# Patient Record
Sex: MALE | Race: WHITE | Employment: OTHER | URBAN - METROPOLITAN AREA
[De-identification: names, ages, dates, MRNs, and addresses within clinical notes are randomized per-mention and may not be internally consistent; named-entity substitution may affect disease eponyms.]

---

## 2020-09-15 DIAGNOSIS — M54.50 LOW BACK PAIN, UNSPECIFIED BACK PAIN LATERALITY, UNSPECIFIED CHRONICITY, UNSPECIFIED WHETHER SCIATICA PRESENT: Primary | ICD-10-CM

## 2020-09-15 DIAGNOSIS — Z11.59 ENCOUNTER FOR SCREENING FOR OTHER VIRAL DISEASES: Primary | ICD-10-CM

## 2020-09-23 RX ORDER — GABAPENTIN 300 MG/1
900 CAPSULE ORAL 3 TIMES DAILY
COMMUNITY

## 2020-09-23 RX ORDER — METHYLPREDNISOLONE 4 MG
TABLET, DOSE PACK ORAL SEE ADMIN INSTRUCTIONS
COMMUNITY
Start: 2020-09-21 | End: 2020-10-06

## 2020-09-23 RX ORDER — ACETAMINOPHEN 500 MG
500-1000 TABLET ORAL EVERY 6 HOURS PRN
COMMUNITY

## 2020-09-28 NOTE — PHARMACY-ADMISSION MEDICATION HISTORY
Admission medication history interview status for this patient is complete. See [facility] admission navigator for allergy information, prior to admission medications and immunization status.     PTA meds completed by pre-admitting nurse, Julisa Aguilera RN, and reviewed by pharmacy.     Prior to Admission medications    Medication Sig Last Dose Taking? Auth Provider   acetaminophen (TYLENOL) 500 MG tablet Take 500-1,000 mg by mouth every 6 hours as needed for mild pain  Yes Reported, Patient   gabapentin (NEURONTIN) 300 MG capsule Take 900 mg by mouth 3 times daily  Yes Reported, Patient

## 2020-10-02 DIAGNOSIS — Z11.59 ENCOUNTER FOR SCREENING FOR OTHER VIRAL DISEASES: ICD-10-CM

## 2020-10-02 PROCEDURE — U0003 INFECTIOUS AGENT DETECTION BY NUCLEIC ACID (DNA OR RNA); SEVERE ACUTE RESPIRATORY SYNDROME CORONAVIRUS 2 (SARS-COV-2) (CORONAVIRUS DISEASE [COVID-19]), AMPLIFIED PROBE TECHNIQUE, MAKING USE OF HIGH THROUGHPUT TECHNOLOGIES AS DESCRIBED BY CMS-2020-01-R: HCPCS | Performed by: ORTHOPAEDIC SURGERY

## 2020-10-02 ASSESSMENT — MIFFLIN-ST. JEOR: SCORE: 1527.47

## 2020-10-03 LAB
SARS-COV-2 RNA SPEC QL NAA+PROBE: NOT DETECTED
SPECIMEN SOURCE: NORMAL

## 2020-10-06 ENCOUNTER — ANESTHESIA (OUTPATIENT)
Dept: SURGERY | Facility: CLINIC | Age: 60
DRG: 473 | End: 2020-10-06
Payer: COMMERCIAL

## 2020-10-06 ENCOUNTER — ANESTHESIA EVENT (OUTPATIENT)
Dept: SURGERY | Facility: CLINIC | Age: 60
DRG: 473 | End: 2020-10-06
Payer: COMMERCIAL

## 2020-10-06 ENCOUNTER — APPOINTMENT (OUTPATIENT)
Dept: GENERAL RADIOLOGY | Facility: CLINIC | Age: 60
DRG: 473 | End: 2020-10-06
Attending: ORTHOPAEDIC SURGERY
Payer: COMMERCIAL

## 2020-10-06 ENCOUNTER — HOSPITAL ENCOUNTER (INPATIENT)
Facility: CLINIC | Age: 60
LOS: 1 days | Discharge: HOME | DRG: 473 | End: 2020-10-07
Attending: ORTHOPAEDIC SURGERY | Admitting: ORTHOPAEDIC SURGERY
Payer: COMMERCIAL

## 2020-10-06 PROBLEM — M54.12 CERVICAL RADICULOPATHY: Status: ACTIVE | Noted: 2020-10-06

## 2020-10-06 LAB
ABO + RH BLD: NORMAL
ABO + RH BLD: NORMAL
BLD GP AB SCN SERPL QL: NORMAL
BLOOD BANK CMNT PATIENT-IMP: NORMAL
INR PPP: 0.88 (ref 0.86–1.14)
SPECIMEN EXP DATE BLD: NORMAL

## 2020-10-06 PROCEDURE — 999N000136 HC STATISTIC PRE PROC ASSESS II: Performed by: ORTHOPAEDIC SURGERY

## 2020-10-06 PROCEDURE — 278N000051 HC OR IMPLANT GENERAL: Performed by: ORTHOPAEDIC SURGERY

## 2020-10-06 PROCEDURE — 258N000003 HC RX IP 258 OP 636: Performed by: NURSE ANESTHETIST, CERTIFIED REGISTERED

## 2020-10-06 PROCEDURE — 250N000011 HC RX IP 250 OP 636: Performed by: PHYSICIAN ASSISTANT

## 2020-10-06 PROCEDURE — 258N000003 HC RX IP 258 OP 636: Performed by: ANESTHESIOLOGY

## 2020-10-06 PROCEDURE — 272N000002 HC OR SUPPLY OTHER OPNP: Performed by: ORTHOPAEDIC SURGERY

## 2020-10-06 PROCEDURE — C1713 ANCHOR/SCREW BN/BN,TIS/BN: HCPCS | Performed by: ORTHOPAEDIC SURGERY

## 2020-10-06 PROCEDURE — 86900 BLOOD TYPING SEROLOGIC ABO: CPT | Performed by: ANESTHESIOLOGY

## 2020-10-06 PROCEDURE — 250N000011 HC RX IP 250 OP 636: Performed by: NURSE ANESTHETIST, CERTIFIED REGISTERED

## 2020-10-06 PROCEDURE — 258N000001 HC RX 258: Performed by: ORTHOPAEDIC SURGERY

## 2020-10-06 PROCEDURE — 250N000011 HC RX IP 250 OP 636: Performed by: ORTHOPAEDIC SURGERY

## 2020-10-06 PROCEDURE — 0RG1070 FUSION OF CERVICAL VERTEBRAL JOINT WITH AUTOLOGOUS TISSUE SUBSTITUTE, ANTERIOR APPROACH, ANTERIOR COLUMN, OPEN APPROACH: ICD-10-PCS | Performed by: ORTHOPAEDIC SURGERY

## 2020-10-06 PROCEDURE — 86901 BLOOD TYPING SEROLOGIC RH(D): CPT | Performed by: ANESTHESIOLOGY

## 2020-10-06 PROCEDURE — 272N000001 HC OR GENERAL SUPPLY STERILE: Performed by: ORTHOPAEDIC SURGERY

## 2020-10-06 PROCEDURE — 250N000009 HC RX 250: Performed by: NURSE ANESTHETIST, CERTIFIED REGISTERED

## 2020-10-06 PROCEDURE — L0172 CERV COL SR FOAM 2PC PRE OTS: HCPCS

## 2020-10-06 PROCEDURE — 370N000002 HC ANESTHESIA TECHNICAL FEE, EACH ADDTL 15 MIN: Performed by: ORTHOPAEDIC SURGERY

## 2020-10-06 PROCEDURE — 258N000003 HC RX IP 258 OP 636: Performed by: PHYSICIAN ASSISTANT

## 2020-10-06 PROCEDURE — 120N000001 HC R&B MED SURG/OB

## 2020-10-06 PROCEDURE — 250N000009 HC RX 250: Performed by: PHYSICIAN ASSISTANT

## 2020-10-06 PROCEDURE — 0RP104Z REMOVAL OF INTERNAL FIXATION DEVICE FROM CERVICAL VERTEBRAL JOINT, OPEN APPROACH: ICD-10-PCS | Performed by: ORTHOPAEDIC SURGERY

## 2020-10-06 PROCEDURE — 36415 COLL VENOUS BLD VENIPUNCTURE: CPT | Performed by: PHYSICIAN ASSISTANT

## 2020-10-06 PROCEDURE — 27211024 ZZHC OR SUPPLY OTHER OPNP: Performed by: ORTHOPAEDIC SURGERY

## 2020-10-06 PROCEDURE — 360N000033 HC SURGERY LEVEL 5 EA 15 ADDTL MIN: Performed by: ORTHOPAEDIC SURGERY

## 2020-10-06 PROCEDURE — 85610 PROTHROMBIN TIME: CPT | Performed by: PHYSICIAN ASSISTANT

## 2020-10-06 PROCEDURE — 250N000011 HC RX IP 250 OP 636: Performed by: ANESTHESIOLOGY

## 2020-10-06 PROCEDURE — 86850 RBC ANTIBODY SCREEN: CPT | Performed by: ANESTHESIOLOGY

## 2020-10-06 PROCEDURE — 360N000036 HC SURGERY LEVEL 5 W FLUORO 1ST 30 MIN: Performed by: ORTHOPAEDIC SURGERY

## 2020-10-06 PROCEDURE — 999N000179 XR SURGERY CARM FLUORO LESS THAN 5 MIN W STILLS: Mod: TC

## 2020-10-06 PROCEDURE — 250N000013 HC RX MED GY IP 250 OP 250 PS 637: Performed by: PHYSICIAN ASSISTANT

## 2020-10-06 PROCEDURE — 370N000001 HC ANESTHESIA TECHNICAL FEE, 1ST 30 MIN: Performed by: ORTHOPAEDIC SURGERY

## 2020-10-06 PROCEDURE — 761N000001 HC RECOVERY PHASE 1 LEVEL 1 FIRST HR: Performed by: ORTHOPAEDIC SURGERY

## 2020-10-06 PROCEDURE — 761N000002 HC RECOVERY PHASE 1 LEVEL 1 EA ADDTL HR: Performed by: ORTHOPAEDIC SURGERY

## 2020-10-06 RX ORDER — PROPOFOL 10 MG/ML
INJECTION, EMULSION INTRAVENOUS CONTINUOUS PRN
Status: DISCONTINUED | OUTPATIENT
Start: 2020-10-06 | End: 2020-10-06

## 2020-10-06 RX ORDER — LIDOCAINE 40 MG/G
CREAM TOPICAL
Status: DISCONTINUED | OUTPATIENT
Start: 2020-10-06 | End: 2020-10-07 | Stop reason: HOSPADM

## 2020-10-06 RX ORDER — TIZANIDINE 2 MG/1
2-4 TABLET ORAL EVERY 6 HOURS PRN
Status: DISCONTINUED | OUTPATIENT
Start: 2020-10-06 | End: 2020-10-07 | Stop reason: HOSPADM

## 2020-10-06 RX ORDER — ONDANSETRON 4 MG/1
4 TABLET, ORALLY DISINTEGRATING ORAL EVERY 6 HOURS PRN
Status: DISCONTINUED | OUTPATIENT
Start: 2020-10-06 | End: 2020-10-07 | Stop reason: HOSPADM

## 2020-10-06 RX ORDER — DIPHENHYDRAMINE HCL 25 MG
25 CAPSULE ORAL EVERY 6 HOURS PRN
Status: DISCONTINUED | OUTPATIENT
Start: 2020-10-06 | End: 2020-10-07 | Stop reason: HOSPADM

## 2020-10-06 RX ORDER — AMOXICILLIN 250 MG
1-2 CAPSULE ORAL 2 TIMES DAILY
Qty: 100 TABLET | Refills: 0 | Status: SHIPPED | OUTPATIENT
Start: 2020-10-06

## 2020-10-06 RX ORDER — LIDOCAINE 40 MG/G
CREAM TOPICAL
Status: DISCONTINUED | OUTPATIENT
Start: 2020-10-06 | End: 2020-10-06 | Stop reason: HOSPADM

## 2020-10-06 RX ORDER — ALPRAZOLAM 1 MG
.5-1 TABLET ORAL 3 TIMES DAILY PRN
COMMUNITY

## 2020-10-06 RX ORDER — FENTANYL CITRATE-0.9 % NACL/PF 10 MCG/ML
PLASTIC BAG, INJECTION (ML) INTRAVENOUS CONTINUOUS PRN
Status: DISCONTINUED | OUTPATIENT
Start: 2020-10-06 | End: 2020-10-06

## 2020-10-06 RX ORDER — LABETALOL 20 MG/4 ML (5 MG/ML) INTRAVENOUS SYRINGE
10
Status: DISCONTINUED | OUTPATIENT
Start: 2020-10-06 | End: 2020-10-06 | Stop reason: HOSPADM

## 2020-10-06 RX ORDER — OXYCODONE HYDROCHLORIDE 5 MG/1
5-10 TABLET ORAL EVERY 4 HOURS PRN
Qty: 40 TABLET | Refills: 0 | Status: SHIPPED | OUTPATIENT
Start: 2020-10-06

## 2020-10-06 RX ORDER — SCOLOPAMINE TRANSDERMAL SYSTEM 1 MG/1
1 PATCH, EXTENDED RELEASE TRANSDERMAL ONCE
Status: COMPLETED | OUTPATIENT
Start: 2020-10-06 | End: 2020-10-07

## 2020-10-06 RX ORDER — METOCLOPRAMIDE HYDROCHLORIDE 5 MG/ML
10 INJECTION INTRAMUSCULAR; INTRAVENOUS EVERY 6 HOURS PRN
Status: DISCONTINUED | OUTPATIENT
Start: 2020-10-06 | End: 2020-10-07 | Stop reason: HOSPADM

## 2020-10-06 RX ORDER — CLINDAMYCIN PHOSPHATE 900 MG/50ML
900 INJECTION, SOLUTION INTRAVENOUS SEE ADMIN INSTRUCTIONS
Status: DISCONTINUED | OUTPATIENT
Start: 2020-10-06 | End: 2020-10-06 | Stop reason: HOSPADM

## 2020-10-06 RX ORDER — ONDANSETRON 4 MG/1
4 TABLET, ORALLY DISINTEGRATING ORAL EVERY 30 MIN PRN
Status: DISCONTINUED | OUTPATIENT
Start: 2020-10-06 | End: 2020-10-06 | Stop reason: HOSPADM

## 2020-10-06 RX ORDER — TRANEXAMIC ACID 10 MG/ML
5 INJECTION, SOLUTION INTRAVENOUS CONTINUOUS
Status: DISCONTINUED | OUTPATIENT
Start: 2020-10-06 | End: 2020-10-06 | Stop reason: HOSPADM

## 2020-10-06 RX ORDER — DEXAMETHASONE SODIUM PHOSPHATE 10 MG/ML
10 INJECTION, SOLUTION INTRAMUSCULAR; INTRAVENOUS ONCE
Status: DISCONTINUED | OUTPATIENT
Start: 2020-10-06 | End: 2020-10-06 | Stop reason: HOSPADM

## 2020-10-06 RX ORDER — DEXAMETHASONE SODIUM PHOSPHATE 4 MG/ML
INJECTION, SOLUTION INTRA-ARTICULAR; INTRALESIONAL; INTRAMUSCULAR; INTRAVENOUS; SOFT TISSUE PRN
Status: DISCONTINUED | OUTPATIENT
Start: 2020-10-06 | End: 2020-10-06

## 2020-10-06 RX ORDER — TRANEXAMIC ACID 10 MG/ML
1 INJECTION, SOLUTION INTRAVENOUS ONCE
Status: DISCONTINUED | OUTPATIENT
Start: 2020-10-06 | End: 2020-10-06 | Stop reason: HOSPADM

## 2020-10-06 RX ORDER — CLINDAMYCIN PHOSPHATE 900 MG/50ML
900 INJECTION, SOLUTION INTRAVENOUS
Status: DISCONTINUED | OUTPATIENT
Start: 2020-10-06 | End: 2020-10-06 | Stop reason: HOSPADM

## 2020-10-06 RX ORDER — PROCHLORPERAZINE MALEATE 10 MG
10 TABLET ORAL EVERY 6 HOURS PRN
Status: DISCONTINUED | OUTPATIENT
Start: 2020-10-06 | End: 2020-10-07 | Stop reason: HOSPADM

## 2020-10-06 RX ORDER — FENTANYL CITRATE 50 UG/ML
25-50 INJECTION, SOLUTION INTRAMUSCULAR; INTRAVENOUS
Status: DISCONTINUED | OUTPATIENT
Start: 2020-10-06 | End: 2020-10-06 | Stop reason: HOSPADM

## 2020-10-06 RX ORDER — DIPHENHYDRAMINE HYDROCHLORIDE 50 MG/ML
25 INJECTION INTRAMUSCULAR; INTRAVENOUS EVERY 6 HOURS PRN
Status: DISCONTINUED | OUTPATIENT
Start: 2020-10-06 | End: 2020-10-07 | Stop reason: HOSPADM

## 2020-10-06 RX ORDER — ACETAMINOPHEN 325 MG/1
975 TABLET ORAL EVERY 8 HOURS
Status: DISCONTINUED | OUTPATIENT
Start: 2020-10-06 | End: 2020-10-07 | Stop reason: HOSPADM

## 2020-10-06 RX ORDER — TAMSULOSIN HYDROCHLORIDE 0.4 MG/1
0.4 CAPSULE ORAL ONCE
Status: DISCONTINUED | OUTPATIENT
Start: 2020-10-06 | End: 2020-10-06 | Stop reason: HOSPADM

## 2020-10-06 RX ORDER — SODIUM CHLORIDE, SODIUM LACTATE, POTASSIUM CHLORIDE, CALCIUM CHLORIDE 600; 310; 30; 20 MG/100ML; MG/100ML; MG/100ML; MG/100ML
INJECTION, SOLUTION INTRAVENOUS CONTINUOUS
Status: DISCONTINUED | OUTPATIENT
Start: 2020-10-06 | End: 2020-10-06 | Stop reason: HOSPADM

## 2020-10-06 RX ORDER — TIZANIDINE 2 MG/1
2-4 TABLET ORAL EVERY 6 HOURS PRN
Qty: 60 TABLET | Refills: 0 | Status: SHIPPED | OUTPATIENT
Start: 2020-10-06

## 2020-10-06 RX ORDER — FENTANYL CITRATE 50 UG/ML
INJECTION, SOLUTION INTRAMUSCULAR; INTRAVENOUS PRN
Status: DISCONTINUED | OUTPATIENT
Start: 2020-10-06 | End: 2020-10-06

## 2020-10-06 RX ORDER — HYDROMORPHONE HYDROCHLORIDE 1 MG/ML
.3-.5 INJECTION, SOLUTION INTRAMUSCULAR; INTRAVENOUS; SUBCUTANEOUS EVERY 5 MIN PRN
Status: DISCONTINUED | OUTPATIENT
Start: 2020-10-06 | End: 2020-10-06 | Stop reason: HOSPADM

## 2020-10-06 RX ORDER — AMOXICILLIN 250 MG
2 CAPSULE ORAL 2 TIMES DAILY
Status: DISCONTINUED | OUTPATIENT
Start: 2020-10-06 | End: 2020-10-07 | Stop reason: HOSPADM

## 2020-10-06 RX ORDER — AMOXICILLIN 250 MG
1 CAPSULE ORAL 2 TIMES DAILY
Status: DISCONTINUED | OUTPATIENT
Start: 2020-10-06 | End: 2020-10-07 | Stop reason: HOSPADM

## 2020-10-06 RX ORDER — ACETAMINOPHEN 325 MG/1
650 TABLET ORAL EVERY 4 HOURS PRN
Status: DISCONTINUED | OUTPATIENT
Start: 2020-10-09 | End: 2020-10-07 | Stop reason: HOSPADM

## 2020-10-06 RX ORDER — ONDANSETRON 2 MG/ML
4 INJECTION INTRAMUSCULAR; INTRAVENOUS EVERY 30 MIN PRN
Status: DISCONTINUED | OUTPATIENT
Start: 2020-10-06 | End: 2020-10-06 | Stop reason: HOSPADM

## 2020-10-06 RX ORDER — NALOXONE HYDROCHLORIDE 0.4 MG/ML
.1-.4 INJECTION, SOLUTION INTRAMUSCULAR; INTRAVENOUS; SUBCUTANEOUS
Status: ACTIVE | OUTPATIENT
Start: 2020-10-06 | End: 2020-10-07

## 2020-10-06 RX ORDER — METOCLOPRAMIDE 10 MG/1
10 TABLET ORAL EVERY 6 HOURS PRN
Status: DISCONTINUED | OUTPATIENT
Start: 2020-10-06 | End: 2020-10-07 | Stop reason: HOSPADM

## 2020-10-06 RX ORDER — CLINDAMYCIN PHOSPHATE 900 MG/50ML
900 INJECTION, SOLUTION INTRAVENOUS EVERY 8 HOURS
Status: COMPLETED | OUTPATIENT
Start: 2020-10-06 | End: 2020-10-07

## 2020-10-06 RX ORDER — ACETAMINOPHEN 325 MG/1
975 TABLET ORAL ONCE
Status: COMPLETED | OUTPATIENT
Start: 2020-10-06 | End: 2020-10-06

## 2020-10-06 RX ORDER — LIDOCAINE HYDROCHLORIDE 10 MG/ML
INJECTION, SOLUTION INFILTRATION; PERINEURAL PRN
Status: DISCONTINUED | OUTPATIENT
Start: 2020-10-06 | End: 2020-10-06

## 2020-10-06 RX ORDER — NALOXONE HYDROCHLORIDE 0.4 MG/ML
.1-.4 INJECTION, SOLUTION INTRAMUSCULAR; INTRAVENOUS; SUBCUTANEOUS
Status: DISCONTINUED | OUTPATIENT
Start: 2020-10-06 | End: 2020-10-07 | Stop reason: HOSPADM

## 2020-10-06 RX ORDER — KETAMINE HYDROCHLORIDE 10 MG/ML
INJECTION INTRAMUSCULAR; INTRAVENOUS PRN
Status: DISCONTINUED | OUTPATIENT
Start: 2020-10-06 | End: 2020-10-06

## 2020-10-06 RX ORDER — ONDANSETRON 2 MG/ML
4 INJECTION INTRAMUSCULAR; INTRAVENOUS EVERY 6 HOURS PRN
Status: DISCONTINUED | OUTPATIENT
Start: 2020-10-06 | End: 2020-10-07 | Stop reason: HOSPADM

## 2020-10-06 RX ORDER — OXYCODONE HYDROCHLORIDE 5 MG/1
5-15 TABLET ORAL
Status: DISCONTINUED | OUTPATIENT
Start: 2020-10-06 | End: 2020-10-07 | Stop reason: HOSPADM

## 2020-10-06 RX ORDER — PROPOFOL 10 MG/ML
INJECTION, EMULSION INTRAVENOUS PRN
Status: DISCONTINUED | OUTPATIENT
Start: 2020-10-06 | End: 2020-10-06

## 2020-10-06 RX ADMIN — PHENYLEPHRINE HYDROCHLORIDE 100 MCG: 10 INJECTION INTRAVENOUS at 11:31

## 2020-10-06 RX ADMIN — ROCURONIUM BROMIDE 5 MG: 10 INJECTION INTRAVENOUS at 10:54

## 2020-10-06 RX ADMIN — PHENYLEPHRINE HYDROCHLORIDE 150 MCG: 10 INJECTION INTRAVENOUS at 11:52

## 2020-10-06 RX ADMIN — HYDROMORPHONE HYDROCHLORIDE 1 MG: 1 INJECTION, SOLUTION INTRAMUSCULAR; INTRAVENOUS; SUBCUTANEOUS at 11:12

## 2020-10-06 RX ADMIN — Medication 20 MCG/MIN: at 11:40

## 2020-10-06 RX ADMIN — SODIUM CHLORIDE, POTASSIUM CHLORIDE, SODIUM LACTATE AND CALCIUM CHLORIDE: 600; 310; 30; 20 INJECTION, SOLUTION INTRAVENOUS at 10:02

## 2020-10-06 RX ADMIN — Medication 2 MG: at 10:55

## 2020-10-06 RX ADMIN — OXYCODONE HYDROCHLORIDE 5 MG: 5 TABLET ORAL at 17:18

## 2020-10-06 RX ADMIN — PROPOFOL 50 MCG/KG/MIN: 10 INJECTION, EMULSION INTRAVENOUS at 11:01

## 2020-10-06 RX ADMIN — CLINDAMYCIN PHOSPHATE 900 MG: 900 INJECTION, SOLUTION INTRAVENOUS at 10:45

## 2020-10-06 RX ADMIN — FENTANYL CITRATE 50 MCG: 50 INJECTION, SOLUTION INTRAMUSCULAR; INTRAVENOUS at 12:54

## 2020-10-06 RX ADMIN — PHENYLEPHRINE HYDROCHLORIDE 0.8 MCG/KG/MIN: 10 INJECTION INTRAVENOUS at 11:50

## 2020-10-06 RX ADMIN — ACETAMINOPHEN 975 MG: 325 TABLET, FILM COATED ORAL at 09:11

## 2020-10-06 RX ADMIN — PHENYLEPHRINE HYDROCHLORIDE 100 MCG: 10 INJECTION INTRAVENOUS at 11:55

## 2020-10-06 RX ADMIN — SODIUM CHLORIDE, POTASSIUM CHLORIDE, SODIUM LACTATE AND CALCIUM CHLORIDE: 600; 310; 30; 20 INJECTION, SOLUTION INTRAVENOUS at 11:40

## 2020-10-06 RX ADMIN — LIDOCAINE HYDROCHLORIDE 30 MG: 10 INJECTION, SOLUTION INFILTRATION; PERINEURAL at 10:54

## 2020-10-06 RX ADMIN — PHENYLEPHRINE HYDROCHLORIDE 100 MCG: 10 INJECTION INTRAVENOUS at 11:28

## 2020-10-06 RX ADMIN — PROPOFOL 150 MG: 10 INJECTION, EMULSION INTRAVENOUS at 10:54

## 2020-10-06 RX ADMIN — SODIUM CHLORIDE, POTASSIUM CHLORIDE, SODIUM LACTATE AND CALCIUM CHLORIDE: 600; 310; 30; 20 INJECTION, SOLUTION INTRAVENOUS at 09:55

## 2020-10-06 RX ADMIN — FENTANYL CITRATE 100 MCG: 50 INJECTION, SOLUTION INTRAMUSCULAR; INTRAVENOUS at 10:54

## 2020-10-06 RX ADMIN — OXYCODONE HYDROCHLORIDE 15 MG: 5 TABLET ORAL at 19:36

## 2020-10-06 RX ADMIN — DOCUSATE SODIUM AND SENNOSIDES 2 TABLET: 8.6; 5 TABLET ORAL at 19:36

## 2020-10-06 RX ADMIN — CLINDAMYCIN IN 5 PERCENT DEXTROSE 900 MG: 18 INJECTION, SOLUTION INTRAVENOUS at 18:55

## 2020-10-06 RX ADMIN — HYDROMORPHONE HYDROCHLORIDE 0.25 MG: 1 INJECTION, SOLUTION INTRAMUSCULAR; INTRAVENOUS; SUBCUTANEOUS at 11:33

## 2020-10-06 RX ADMIN — PHENYLEPHRINE HYDROCHLORIDE 50 MCG: 10 INJECTION INTRAVENOUS at 11:35

## 2020-10-06 RX ADMIN — Medication 20 MG: at 10:54

## 2020-10-06 RX ADMIN — PHENYLEPHRINE HYDROCHLORIDE 100 MCG: 10 INJECTION INTRAVENOUS at 11:48

## 2020-10-06 RX ADMIN — ACETAMINOPHEN 975 MG: 325 TABLET, FILM COATED ORAL at 16:24

## 2020-10-06 RX ADMIN — HYDROMORPHONE HYDROCHLORIDE 0.5 MG: 1 INJECTION, SOLUTION INTRAMUSCULAR; INTRAVENOUS; SUBCUTANEOUS at 12:45

## 2020-10-06 RX ADMIN — Medication 30 MG: at 11:11

## 2020-10-06 RX ADMIN — MIDAZOLAM 2 MG: 1 INJECTION INTRAMUSCULAR; INTRAVENOUS at 10:45

## 2020-10-06 RX ADMIN — DEXAMETHASONE SODIUM PHOSPHATE 10 MG: 4 INJECTION, SOLUTION INTRA-ARTICULAR; INTRALESIONAL; INTRAMUSCULAR; INTRAVENOUS; SOFT TISSUE at 11:15

## 2020-10-06 RX ADMIN — HYDROMORPHONE HYDROCHLORIDE 0.25 MG: 1 INJECTION, SOLUTION INTRAMUSCULAR; INTRAVENOUS; SUBCUTANEOUS at 11:48

## 2020-10-06 RX ADMIN — Medication 100 MG: at 10:54

## 2020-10-06 RX ADMIN — PHENYLEPHRINE HYDROCHLORIDE 100 MCG: 10 INJECTION INTRAVENOUS at 11:38

## 2020-10-06 RX ADMIN — FENTANYL CITRATE 50 MCG: 50 INJECTION, SOLUTION INTRAMUSCULAR; INTRAVENOUS at 12:37

## 2020-10-06 RX ADMIN — OXYCODONE HYDROCHLORIDE 5 MG: 5 TABLET ORAL at 16:24

## 2020-10-06 RX ADMIN — SCOPOLAMINE 1 PATCH: 1 PATCH TRANSDERMAL at 09:14

## 2020-10-06 RX ADMIN — PHENYLEPHRINE HYDROCHLORIDE 50 MCG: 10 INJECTION INTRAVENOUS at 11:25

## 2020-10-06 RX ADMIN — TRANEXAMIC ACID 1 G: 1 INJECTION, SOLUTION INTRAVENOUS at 11:06

## 2020-10-06 ASSESSMENT — MIFFLIN-ST. JEOR
SCORE: 1486.65
SCORE: 1509.33

## 2020-10-06 ASSESSMENT — LIFESTYLE VARIABLES: TOBACCO_USE: 1

## 2020-10-06 ASSESSMENT — ENCOUNTER SYMPTOMS: DYSRHYTHMIAS: 0

## 2020-10-06 ASSESSMENT — ACTIVITIES OF DAILY LIVING (ADL)
ADLS_ACUITY_SCORE: 10
ADLS_ACUITY_SCORE: 10

## 2020-10-06 NOTE — BRIEF OP NOTE
Brief Operative Note    Surgery Date: 10/6/2020     Primary Surgeon: Surgeon(s): Italo    Assistants: Donal Fuller PA-C    Post-op Diagnosis: cervical radiculopathy     Procedure: C3-4 ACDF with partial inferior C3 corpectomy and C4-5 hardware removal     EBL: 10 mL    Specimens: None    Complications / Findings:  No complications.  Expected findings ofg stenosis.      Indications:   I was asked by Dr. Puckett to assist with surgery. I positioned and prepped the patient. I retracted soft tissue for operative exposure. I suctioned fluids. I provided traction for dissection. I helped to ligate blood vessels. I helped Dr. Puckett identify and protect important structures. I sutured and bandaged the incision.   The procedure was medically necessary for an assistant because Dr. Goncalves needed the operative exposure and assistance that I provided. This allowed him to safely and efficiently operate. It was also important that I help ligate blood vessels to maintain hemostasis and reduce the bleeding risk. The assistance that I provided reduced operative time which meant less general anesthetic for the patient.    Donal Fuller PA-C

## 2020-10-06 NOTE — ANESTHESIA PREPROCEDURE EVALUATION
Anesthesia Pre-Procedure Evaluation    Patient: Adrian Gil   MRN: [id number] : 1960          Preoperative Diagnosis: Stenosis, cervical spine [M48.02]  Cord compression (H) [G95.20]    Procedure(s):  Anterior cervical decompression and fusion Cervical 3-4 with partial inferior corpectomy cervical 3 and hardware removal cervical 4-5    History reviewed. No pertinent past medical history.  Past Surgical History:   Procedure Laterality Date     BACK SURGERY      cervical fusion x1, cervical decompression x1, lumbar fusion x1     C ORAL SURGERY PROCEDURE      tooth extraction     COLONOSCOPY       ORTHOPEDIC SURGERY Left 2006    arthroscopy torn meniscus     Anesthesia Evaluation     .             ROS/MED HX    ENT/Pulmonary:     (+)tobacco use, , . .   (-) sleep apnea   Neurologic:     (+)neuropathy - UE weakness,     Cardiovascular:        (-) hypertension, CAD, CHF, arrhythmias, pulmonary hypertension and dyslipidemia   METS/Exercise Tolerance:     Hematologic:     (+) Anemia, -      Musculoskeletal:   (+) arthritis,  other musculoskeletal- Cervical Cord Compression      GI/Hepatic:        (-) GERD and hepatitis   Renal/Genitourinary:      (-) renal disease   Endo:     (+) Chronic steroid usage for Other .   (-) Type I DM, Type II DM, thyroid disease, other endocrine disorder and obesity   Psychiatric:        (-) psychiatric history   Infectious Disease:  - neg infectious disease ROS       Malignancy:         Other:    (+) H/O Chronic Pain,H/O chronic opiod use ,                         Physical Exam      Airway   Mallampati: II  TM distance: >3 FB  Neck ROM: limited    Dental     Cardiovascular   Rhythm and rate: regular and normal  (-) no murmur    Pulmonary    breath sounds clear to auscultation    Other findings: No lab results found.   No lab results found.        No results found for: WBC, HGB, HCT, PLT, CRP, SED, NA, POTASSIUM, CHLORIDE, CO2, BUN, CR, GLC, CHONG, PHOS, MAG, ALBUMIN, PROTTOTAL, ALT, AST,  "GGT, ALKPHOS, BILITOTAL, BILIDIRECT, LIPASE, AMYLASE, KARL, PTT, INR, FIBR, TSH, T4, T3, HCG, HCGS, CKTOTAL, CKMB, TROPN    Preop Vitals  BP Readings from Last 3 Encounters:   No data found for BP    Pulse Readings from Last 3 Encounters:   No data found for Pulse      Resp Readings from Last 3 Encounters:   No data found for Resp    SpO2 Readings from Last 3 Encounters:   No data found for SpO2      Temp Readings from Last 1 Encounters:   No data found for Temp    Ht Readings from Last 1 Encounters:   10/02/20 1.778 m (5' 10\")      Wt Readings from Last 1 Encounters:   10/02/20 72.1 kg (159 lb)    Estimated body mass index is 22.81 kg/m  as calculated from the following:    Height as of this encounter: 1.778 m (5' 10\").    Weight as of this encounter: 72.1 kg (159 lb).       Anesthesia Plan      History & Physical Review  History and physical reviewed and following examination; no interval change.    ASA Status:  2 .    NPO Status:  > 8 hours    Plan for General with Propofol induction. Maintenance will be Balanced.    PONV prophylaxis:  Ondansetron (or other 5HT-3)  Additional equipment: Videolaryngoscope Propofol gtt + Sevo + 60% O2 please    Addendum: Monitoring tech requests TIVA per neurologist. Ketamine OK. Anectine on induction.  Hold Sevo. Lakisha        Postoperative Care  Postoperative pain management:  IV analgesics and Oral pain medications.      Consents                 Tahir Nguyen MD                    .  "

## 2020-10-06 NOTE — ANESTHESIA POSTPROCEDURE EVALUATION
Patient: Adrian Gil    Procedure(s):  Anterior cervical decompression and fusion Cervical 3-4 with partial inferior corpectomy cervical 3 and hardware removal cervical 4-5    Diagnosis:Stenosis, cervical spine [M48.02]  Cord compression (H) [G95.20]  Diagnosis Additional Information: No value filed.    Anesthesia Type:  General    Note:  Anesthesia Post Evaluation    Patient location during evaluation: PACU  Patient participation: Able to fully participate in evaluation  Level of consciousness: awake  Pain management: adequate  Airway patency: patent  Cardiovascular status: acceptable  Respiratory status: acceptable  Hydration status: acceptable  PONV: none             Last vitals:  Vitals:    10/06/20 1624 10/06/20 1718 10/06/20 1815   BP:      Pulse:      Resp: 18 18 18   Temp:      SpO2:            Electronically Signed By: Josh Johnson MD  October 6, 2020  6:31 PM

## 2020-10-06 NOTE — DISCHARGE SUMMARY
Discharge Summary Note    Admitted: 10/6/2020    Discharged: 10/7/2020    Service: Ortho-Spine    Staff MD: Dr. Puckett    Admitting Diagnosis:   Cervical radiculopathy    Operations Procedures: C3-4 ACDF with partial inferior C3 corpectomy. C4-5 hardware removal    Complications: None    Discharge Diagnosis: Same    Brief History and Pertinent Objective Findings: Patient has had a long history of cervical radiculopathy resistant to conservative management.  Because of this surgical intervention was discussed and agreed upon.    Hospital Course: Patient was admitted on 10/6/2020 to undergo an elective C3-4 ACDF with paartial inferior C3 corpectomy and C4-5 hardware removal .  Please refer to the operative report for procedure and details.  Post-op the patient did well and was eventually taking oral pain medications for pain management with good control.  They were performing their ADL's with the addition of PT/OT and were cleared for discharge to home.  The were voiding without difficulty and had no other specific medical issues and were discharged at that time.    Discharge Disposition: home    Discharge Instructions: Written instructions were given at time of discharge.    Discharge Medications:     Adrian Gil   Home Medication Instructions YUDI:[id number]    Printed on:10/06/20 0243   Medication Information                      acetaminophen (TYLENOL) 500 MG tablet  Take 500-1,000 mg by mouth every 6 hours as needed for mild pain             ALPRAZolam (XANAX) 1 MG tablet  Take 0.5-1 mg by mouth 3 times daily as needed for anxiety             cholecalciferol (VITAMIN D3) 25 mcg (1000 units) capsule  Take 1,000 Units by mouth             gabapentin (NEURONTIN) 300 MG capsule  Take 900 mg by mouth 3 times daily             MAGNESIUM CITRATE PO               oxyCODONE (ROXICODONE) 5 MG tablet  Take 1-2 tablets (5-10 mg) by mouth every 4 hours as needed for moderate to severe pain             senna-docusate  (SENOKOT-S/PERICOLACE) 8.6-50 MG tablet  Take 1-2 tablets by mouth 2 times daily             tiZANidine (ZANAFLEX) 2 MG tablet  Take 1-2 tablets (2-4 mg) by mouth every 6 hours as needed for muscle spasms                 Follow-up: As previously scheduled.    Donal Fuller PA-C October 7, 2020

## 2020-10-06 NOTE — PLAN OF CARE
hattie PO well, PO pain meds for pain, due to void, HARINDER drain patent, aspen collar in place, numbness and tingling to finger tips

## 2020-10-06 NOTE — PROGRESS NOTES
Arrived to room 641 from PACU at 1410 via cart, transferred to bed via hover mat without difficulty, alert and oriented x 4, oriented to room and call system, dressing CDI, CMS intact except for numbness and tingling in finger tips, IV patent and infusing,HARINDER drain patent, rates pain 5/10, Vashti ice chips and water well. Frequent VS started.

## 2020-10-06 NOTE — ANESTHESIA CARE TRANSFER NOTE
Patient: Adrian Gil    Procedure(s):  Anterior cervical decompression and fusion Cervical 3-4 with partial inferior corpectomy cervical 3 and hardware removal cervical 4-5    Diagnosis: Stenosis, cervical spine [M48.02]  Cord compression (H) [G95.20]  Diagnosis Additional Information: No value filed.    Anesthesia Type:   General     Note:  Airway :Oral Airway and Face Mask    Comments:   Spontaneous respirations, oral suctioned, bilateral eye opening and hand grasps.  Extubated to FM O2 6lpm.  VSS to PACU.      Vitals: (Last set prior to Anesthesia Care Transfer)    CRNA VITALS  10/6/2020 1151 - 10/6/2020 1229      10/6/2020             NIBP:  111/74    Pulse:  73    Resp Rate (observed):  14                Electronically Signed By: KIRA Saucedo CRNA  October 6, 2020  12:29 PM

## 2020-10-07 ENCOUNTER — APPOINTMENT (OUTPATIENT)
Dept: GENERAL RADIOLOGY | Facility: CLINIC | Age: 60
DRG: 473 | End: 2020-10-07
Attending: PHYSICIAN ASSISTANT
Payer: COMMERCIAL

## 2020-10-07 ENCOUNTER — APPOINTMENT (OUTPATIENT)
Dept: PHYSICAL THERAPY | Facility: CLINIC | Age: 60
DRG: 473 | End: 2020-10-07
Attending: PHYSICIAN ASSISTANT
Payer: COMMERCIAL

## 2020-10-07 VITALS
TEMPERATURE: 96.8 F | WEIGHT: 150 LBS | HEIGHT: 70 IN | SYSTOLIC BLOOD PRESSURE: 123 MMHG | DIASTOLIC BLOOD PRESSURE: 75 MMHG | OXYGEN SATURATION: 94 % | RESPIRATION RATE: 16 BRPM | BODY MASS INDEX: 21.47 KG/M2 | HEART RATE: 63 BPM

## 2020-10-07 LAB — GLUCOSE BLDC GLUCOMTR-MCNC: 110 MG/DL (ref 70–99)

## 2020-10-07 PROCEDURE — 72040 X-RAY EXAM NECK SPINE 2-3 VW: CPT

## 2020-10-07 PROCEDURE — 250N000009 HC RX 250: Performed by: PHYSICIAN ASSISTANT

## 2020-10-07 PROCEDURE — 99221 1ST HOSP IP/OBS SF/LOW 40: CPT | Performed by: INTERNAL MEDICINE

## 2020-10-07 PROCEDURE — 250N000013 HC RX MED GY IP 250 OP 250 PS 637: Performed by: PHYSICIAN ASSISTANT

## 2020-10-07 PROCEDURE — 999N001017 HC STATISTIC GLUCOSE BY METER IP

## 2020-10-07 PROCEDURE — 97161 PT EVAL LOW COMPLEX 20 MIN: CPT | Mod: GP

## 2020-10-07 PROCEDURE — 99207 PR CONSULT E&M CHANGED TO INITIAL LEVEL: CPT | Performed by: INTERNAL MEDICINE

## 2020-10-07 RX ADMIN — CLINDAMYCIN IN 5 PERCENT DEXTROSE 900 MG: 18 INJECTION, SOLUTION INTRAVENOUS at 02:30

## 2020-10-07 RX ADMIN — OXYCODONE HYDROCHLORIDE 15 MG: 5 TABLET ORAL at 11:12

## 2020-10-07 RX ADMIN — OXYCODONE HYDROCHLORIDE 15 MG: 5 TABLET ORAL at 08:13

## 2020-10-07 RX ADMIN — ACETAMINOPHEN 975 MG: 325 TABLET, FILM COATED ORAL at 01:26

## 2020-10-07 RX ADMIN — ACETAMINOPHEN 975 MG: 325 TABLET, FILM COATED ORAL at 08:12

## 2020-10-07 RX ADMIN — OXYCODONE HYDROCHLORIDE 15 MG: 5 TABLET ORAL at 02:30

## 2020-10-07 RX ADMIN — OXYCODONE HYDROCHLORIDE 15 MG: 5 TABLET ORAL at 14:10

## 2020-10-07 RX ADMIN — DOCUSATE SODIUM AND SENNOSIDES 2 TABLET: 8.6; 5 TABLET ORAL at 08:13

## 2020-10-07 ASSESSMENT — ACTIVITIES OF DAILY LIVING (ADL)
ADLS_ACUITY_SCORE: 10

## 2020-10-07 NOTE — PLAN OF CARE
Reviewed discharge instructions and medications with patient. Questions answered. Patient discharged to home with wife, discharge instructions, medications (Oxycodone, Zanaflex, Senna), and belongings.    Wife called, reviewed discharge instructions with her

## 2020-10-07 NOTE — PLAN OF CARE
No skilled IP OT needs identified, pt ambulating SBA and able to manage all self-cares. OT orders completed. Please re-order should there be a change in pt's needs.

## 2020-10-07 NOTE — CONSULTS
[facility]  Hospitalist Consult Note  Name: Adrian Gil    MRN: [id number]  YOB: 1960    Age: 60 year old  Date of admission: 10/6/2020  Primary care provider: No Ref-Primary, Physician     Requesting Physician: Dr. Puckett  Reason for consult:  Post-operative medical management         Assessment and Plan:   Adrian Gil is a 60 year old male with a history of chronic neck pain and DJD who was admitted for anterior cervical decompression and fusion of C3-4 with hardware removal at C4-5.  Preoperative note was reviewed and there does not seem to be any unrelated chronic medical issues.    1. DJD s/p  anterior cervical decompression and fusion of C3-4 with hardware removal at C4-5: The patient is doing well, currently has well controlled pain and is hemodynamically stable. Will defer diet, activity, DVT prophylaxis, and pain control to the primary team.    2.  Chronic neck pain: Preoperative note reviewed.  Reportedly this has been an ongoing issue for greater than 10 years and associated with radiculopathy.        Thank you for the consultation, we will continue to follow along during the hospitalization. Please page with any questions or concerns.         History of Present Illness:   Adrian Gil is a 60 year old male with a history of chronic neck pain and DJD who was admitted for anterior cervical decompression and fusion of C3-4 with hardware removal at C4-5.  Preoperative note was reviewed and there does not seem to be any unrelated chronic medical issues.    His drain is still in place.  He denies light headedness, chest pain, dyspnea, fevers or chills.  He notes that the numbness he normally has in his fingers is already better.    The patient had no complications related to the procedure and has had an unremarkable post-operative course to this point. Currently pain is adequately controlled. No nausea, vomiting, diarrhea or constipation.                 Past Medical History:    History reviewed. No pertinent past medical history.          Past Surgical History:     Past Surgical History:   Procedure Laterality Date     BACK SURGERY      cervical fusion x1, cervical decompression x1, lumbar fusion x1     C ORAL SURGERY PROCEDURE      tooth extraction     COLONOSCOPY       ORTHOPEDIC SURGERY Left 2006    arthroscopy torn meniscus               Social History:     Social History     Tobacco Use     Smoking status: Former Smoker     Quit date:      Years since quittin.7     Smokeless tobacco: Never Used   Substance Use Topics     Alcohol use: Yes     Comment: 5 drinks per week             Family History:   Family history was fully reviewed and non-contributory in this case.          Allergies:     Allergies   Allergen Reactions     Penicillins Rash     No PCN since age 11             Medications:     Prior to Admission medications    Medication Sig Last Dose Taking? Auth Provider   acetaminophen (TYLENOL) 500 MG tablet Take 500-1,000 mg by mouth every 6 hours as needed for mild pain Past Month at Unknown time Yes Reported, Patient   ALPRAZolam (XANAX) 1 MG tablet Take 0.5-1 mg by mouth 3 times daily as needed for anxiety 10/6/2020 at 0800 Yes Reported, Patient   cholecalciferol (VITAMIN D3) 25 mcg (1000 units) capsule Take 1,000 Units by mouth Past Month at Unknown time Yes Reported, Patient   gabapentin (NEURONTIN) 300 MG capsule Take 900 mg by mouth 3 times daily Past Month at Unknown time Yes Reported, Patient   MAGNESIUM CITRATE PO  Past Month at Unknown time Yes Reported, Patient   oxyCODONE (ROXICODONE) 5 MG tablet Take 1-2 tablets (5-10 mg) by mouth every 4 hours as needed for moderate to severe pain  Yes Donal Fuller PA-C   senna-docusate (SENOKOT-S/PERICOLACE) 8.6-50 MG tablet Take 1-2 tablets by mouth 2 times daily  Yes Donal Fuller PA-C   tiZANidine (ZANAFLEX) 2 MG tablet Take 1-2 tablets (2-4 mg) by mouth every 6 hours as needed for muscle spasms  Yes Alina  "SYLVIA Mansfield       Current hospital administered medication list (MAR) also reviewed.          Review of Systems:   A comprehensive greater than 10 system review of systems was carried out.  Pertinent positives and negatives are noted above.  Otherwise negative for contributory info.            Physical Exam:   Blood pressure 123/75, pulse 63, temperature 96.8  F (36  C), temperature source Temporal, resp. rate 16, height 1.778 m (5' 10\"), weight 68 kg (150 lb), SpO2 94 %.  Exam:  GENERAL: No apparent distress. Awake, alert, and fully oriented.  c-collar in place, drain also in place.  HEENT: Normocephalic, atraumatic. Extraocular movements intact.  CARDIOVASCULAR: Regular rate and rhythm without murmurs or rubs. No S3.  PULMONARY: Clear bilaterally.  ABDOMINAL: Soft, non-tender, non-distended. Bowel sounds normoactive. No hepatosplenomegaly.  EXTREMITIES: No cyanosis or clubbing. No edema.  NEUROLOGICAL: CN 2-12 grossly intact, no focal neurological deficits.  DERMATOLOGICAL: No rash, ulcer, ecchymoses, jaundice.         Data:   Imaging:  Reviewed.    EKG/Telemetry:  Reviewed.    Labs: Reviewed.   No results for input(s): WBC, HGB, HCT, MCV, PLT in the last 168 hours.       No results found for: NA No results found for: CHLORIDE No results found for: BUN   No results found for: POTASSIUM No results found for: CO2 No results found for: CR       Donal Harper MD  [facility] Hospitalist  October 7, 2020  "

## 2020-10-07 NOTE — PLAN OF CARE
A&OX4, VSS: stable.  Up with SBA.  Aspen neck collar on. CMS: intact. Denies numbness and tingling.  Drsg: CDI. Pain managed with scheduled Tylenol and PRN Oxycodone. Voiding good amounts. Nursing continue to monitor.

## 2020-10-07 NOTE — PROGRESS NOTES
Ortho-Spine Progress Note    Patients chart reviewed as well as nursing notes and rehab notes.  HARINDER drain can be removed now and he can discharge home.    Donal Fuller PA-C 10/7/2020, 12:43 PM

## 2020-10-07 NOTE — PROGRESS NOTES
10/07/20 0931   Quick Adds   Type of Visit Initial PT Evaluation   Living Environment   People in home child(casey), adult;spouse   Current Living Arrangements house   Home Accessibility stairs within home   Number of Stairs, Within Home, Primary   (full flight to access lower level, main living for pt)   Transportation Anticipated car, drives self;family or friend will provide   Living Environment Comments Patient lives with spouse, spouse will be home to assist    Self-Care   Usual Activity Tolerance good   Equipment Currently Used at Home cane, straight   Activity/Exercise/Self-Care Comment Has back brace, limited by back/neck pain   General Information   Onset of Illness/Injury or Date of Surgery 10/06/20   Referring Physician  Donal Fuller PA-C   Patient/Family Therapy Goals Statement (PT) Return to home   Pertinent History of Current Problem (include personal factors and/or comorbidities that impact the POC) POD#1 S/P C3-4 ACDF with partial inferior C3 corpectomy and C4-5 hardware removal.    Existing Precautions/Restrictions spinal   Cognition   Orientation Status (Cognition) oriented x 4   Pain Assessment   Patient Currently in Pain Yes, see Vital Sign flowsheet  (Patient currently reports pain overnight. )   Integumentary/Edema   Integumentary/Edema Comments Mild edema at incision site   Posture    Posture Not impaired   Range of Motion (ROM)   ROM Quick Adds ROM WFL   ROM Comment Did not asses cervical spine ROM    Strength   Manual Muscle Testing Quick Adds Strength WFL   Strength Comments Did not asses cervical strength    Bed Mobility   Comment (Bed Mobility) Patient independent with bed mobility, demonstrated log roll.    Transfers   Transfer Safety Comments Patient independent with transfers   Gait/Stairs (Locomotion)   T[place]rell Level (Gait) independent   Distance in Feet (Required for LE Total Joints) 250 feet    Pattern (Gait) step-through   Negotiation (Stairs) stairs independence  "  [place] Level (Stairs) independent   Comment (Gait/Stairs) Patient negotiated 12 stairs with single hand rail use with step through gait pattern and SBA.    Balance   Balance no deficits were identified   Clinical Impression   Criteria for Skilled Therapeutic Intervention evaluation only   PT Diagnosis (PT) Decreased independence with mobility following surgery.    Influenced by the following impairments Pain and spinal precautions   Functional limitations due to impairments No limitations noted.    Clinical Presentation Stable/Uncomplicated   Clinical Presentation Rationale Patient has moderately complex medical history, is currently in a stable condition, and has good social support.    Clinical Decision Making (Complexity) low complexity   Therapy Frequency (PT) Evaluation only   Predicted Duration of Therapy Intervention (days/wks) 1 day   Risk & Benefits of therapy have been explained evaluation/treatment results reviewed;risks/benefits reviewed;participants included;patient   PT Discharge Planning    PT Discharge Recommendation (DC Rec) home   PT Rationale for DC Rec Patient progressing well with mobility. Has demonstrated independent functional mobility in order to safely return home. No further inpatient therapy needs. Will complete order.    [employer] TM \"6 Clicks\"   2016, Trustees of [employer], under license to [employer].  All rights reserved.   6 Clicks Short Forms Basic Mobility Inpatient Short Form   [employer]  \"6 Clicks\" V.2 Basic Mobility Inpatient Short Form   1. Turning from your back to your side while in a flat bed without using bedrails? 4 - None   2. Moving from lying on your back to sitting on the side of a flat bed without using bedrails? 4 - None   3. Moving to and from a bed to a chair (including a wheelchair)? 4 - None   4. Standing up from a chair using your arms (e.g., wheelchair, or bedside chair)? 4 - None   5. To walk in hospital room? 4 " - None   6. Climbing 3-5 steps with a railing? 4 - None   Basic Mobility Raw Score (Score out of 24.Lower scores equate to lower levels of function) 24   Total Evaluation Time   Total Evaluation Time (Minutes) 15

## 2020-12-01 NOTE — OP NOTE
Adrian Gil  1960  [id number]    OPERATIVE REPORT    DATE OF SURGERY: 10/6/2020      PROCEDURES PERFORMED:      1.         Anterior cervical 50% inferior corpectomy of C3 with decompression C3-4.   2.         Anterior Cervical fusion C3-4  3.         Insertion of intervertebral biomechanical device,  C3-4 (PEEK device).  4.         Anterior cervical plating,  C3-4, with Ginger Aviator anterior cervical plate. (removal of top of C4-7 plate)   5.         [place] of local autograft bone from corpectomy  6.         Monitoring of MEPs, SSEPs, and EMGs without any complications noted throughout.      PREOPERATIVE DIAGNOSES:    1.         Cervical Stenosis,  C3-4  2.         Cervical Myelopathy        POSTOPERATIVE DIAGNOSES:    1.         Cervical Stenosis, C3-4  2.         Cervical Myelopathy      Surgeon: Kwesi Puckett MD    Asst: Donal PATRICK       PREOPERATIVE ANTIBIOTIC:  Kefzol.        ANESTHESIA:  General endotracheal.        ESTIMATED BLOOD LOSS:  10 cc.        COMPLICATIONS:  None.        IMPLANTS:    1.         9 mm Tritanium cage in the C3 corpectomy site  2.         Progenix Plus Putty  3.         One level K2M [place] anterior cervical plate with 16 mm screws x 4.        DRAIN:  #10 J-P drain       SPECIMENS:  None.        FINDINGS:  As expected.        INDICATION FOR PROCEDURE:  Adrian Gil is an unfortunate 60-year-old male with severe spinal cord compression.  Patient had  severe stenosis  C3-4.  We discussed anterior cervical decompression and fusions  C3-4 with inferior C3 corpectomy.   Risks and benefits were extensively discussed including risk of cord injury from osteophyte removal and dural leak from adhesions.  He did wish to proceed with operative management.        DETAILS OF PROCEDURE:  Following the induction of general endotracheal anesthesia, the patient was placed in a supine position on the operative table.  All bony prominences were padded, and the patient was prepped and  draped in the usual fashion utilizing sterile technique.  A timeout was called prior to making incision and antibiotics were in.  At this point, a right-sided transverse incision was made at  C3-4  down to the level of platysma.  Platysma was split in line with its fibers, and blunt palpation was utilized to palpate the anterior cervical spine, and the Trimline retractor set was utilized for visualization.  We then cut the top of the previous plate between the C4 and C5 screw holes and removed the C4 screws.  At this point, anterior cervical discectomy, inferior 50% vertebral body corpectomy and decompression was done at  C3-4 in the following fashion:  Under distraction of [place] pins, an 11 blade was utilized to enter the disc space, and a combination of pituitary rongeurs, straight and curved curets, intradiscal cassandra and intradiscal rasps were utilized to create a radical side-to-side discectomy at  C3-4 . Central, lateral recess and foraminal decompression was done. Inferior C3 vertebral body was take down to a thin posterior shell of bone and was removed with kerrisons. Decompression of the cord was excellent bilaterally.  Corpectomy bone was used as local autograft bone for interbody fusion.  Progenix plus and local bone, were packed within an 9 mm Tritanium intervertebral biomechanical device and  inserted into corpectomy defect at  C3-4  to achieve interbody fusion.  Motor-evoked potentials were stable.        At this time, an appropriate length K2M [place] anterior cervical plate (1level) was chosen and affixed to the anterior cervical spine  C3-4 utilizing temporary transfixion pins.   holes were made with a single drill guide.  16 mm screws x 6 (C5,C6) were placed with excellent purchase.  Locking mechanism was activated.  Final imaging showed excellent position in all views.  Wounds irrigated with antibiotic solution and dilute Betadine wash.  Hemostasis was excellent.  Platysmal layer was closed  with 2-0 Vicryl suture in interrupted fashion over 2 #10 J-P drain.  Subcutaneous tissues were closed with 3-0 Vicryl suture in interrupted fashion.  Skin was closed with 4-0 Monocryl suture.  Steri-Strips were applied.  Sterile dressings were placed.  The patient was transferred to the recovery room bed in satisfactory condition, and had no changes in preoperative neurologic examination and was cardiovascularly intact throughout.            [facility] provided assistance with preoperative positioning, prepping, and draping of the patient. The assistant provided vital operative assistance with retraction using instruments best providing the necessary exposure and visualization for the case, manipulation of tissues to achieve hemostasis, suction for visualization and assisted in wound closure. The assistant also helped place instrumentation under direct visualization of the surgeon. Postoperatively they assisted in the transfer of the patient off of the operative table and transition into the post anesthesia care unit with transition of care being made to the anesthesiologist.    Kwesi Puckett MD   Spine Surgeon

## (undated) DEVICE — SPONGE COTTONOID 1/2X1/2" 80-1400

## (undated) DEVICE — BAG CLEAR TRASH 1.3M 39X33" P4040C

## (undated) DEVICE — PIN DISTRACTION ANCHOR FOR SCR 14MM MDS9091414

## (undated) DEVICE — SYR 10ML LL W/O NDL 302995

## (undated) DEVICE — DECANTER BAG 2002S

## (undated) DEVICE — NDL BLUNT 18GA 1" W/O FILTER 305181

## (undated) DEVICE — LINEN POUCH DBL 5427

## (undated) DEVICE — PACK SMALL SPINE RIDGES

## (undated) DEVICE — IOM FLAT FEE

## (undated) DEVICE — LINEN ORTHO ACL PACK 5447

## (undated) DEVICE — MIDAS REX DISSECTING TOOL  14MH30

## (undated) DEVICE — DRAIN JACKSON PRATT 10MM FLAT 4/4 PERF SU130-1311

## (undated) DEVICE — PREP DURAPREP 26ML APL 8630

## (undated) DEVICE — Device

## (undated) DEVICE — GLOVE PROTEXIS POWDER FREE 8.5 ORTHOPEDIC 2D73ET85

## (undated) DEVICE — SU VICRYL 3-0 PS-2 27" UND J427H

## (undated) DEVICE — DRSG ADAPTIC 3X3" 6112

## (undated) DEVICE — LINEN FULL SHEET 5511

## (undated) DEVICE — SU VICRYL 3-0 SH 8X18" UND J864D

## (undated) DEVICE — GLOVE PROTEXIS BLUE W/NEU-THERA 8.0  2D73EB80

## (undated) DEVICE — GLOVE PROTEXIS POWDER FREE 7.5 ORTHOPEDIC 2D73ET75

## (undated) DEVICE — SUCTION TIP POOLE K770

## (undated) DEVICE — DRAIN JACKSON PRATT RESERVOIR 100ML SU130-1305

## (undated) DEVICE — SUCTION MANIFOLD NEPTUNE 2 SYS 4 PORT 0702-020-000

## (undated) DEVICE — GOWN IMPERVIOUS SPECIALTY XLG/XLONG 32474

## (undated) DEVICE — ESU GROUND PAD ADULT W/CORD E7507

## (undated) DEVICE — SPONGE SURGIFOAM 01GM POWDER 1978

## (undated) DEVICE — ESU ELEC BLADE 2.75" COATED/INSULATED E1455

## (undated) DEVICE — SPONGE KITTNER 30-101

## (undated) DEVICE — CATH IV ANGIO INTRO 12GA 382277

## (undated) DEVICE — SOL NACL 0.9% 20ML VIAL

## (undated) DEVICE — SU VICRYL 2-0 CT-2 27" UND J269H

## (undated) DEVICE — SU MONOCRYL 4-0 PS-2 27" UND Y426H

## (undated) DEVICE — DRAPE IOBAN INCISE 23X17" 6650EZ

## (undated) DEVICE — DRAPE C-ARM 60X42" 1013

## (undated) DEVICE — DRAPE STERI TOWEL LG 1010

## (undated) DEVICE — LINEN HALF SHEET 5512

## (undated) DEVICE — SU VICRYL 2-0 CT-2 CR 8X18" J726D

## (undated) DEVICE — LINEN DRAPE 54X72" 5467

## (undated) DEVICE — TOOL DISSECT MIDAS MR8 14CM MTL CUTTR 3MM MR8-14MC30

## (undated) DEVICE — PREP POVIDONE IODINE SOLUTION 10% 4OZ BOTTLE 29906-004

## (undated) RX ORDER — FENTANYL CITRATE 50 UG/ML
INJECTION, SOLUTION INTRAMUSCULAR; INTRAVENOUS
Status: DISPENSED
Start: 2020-10-06

## (undated) RX ORDER — FENTANYL CITRATE-0.9 % NACL/PF 10 MCG/ML
PLASTIC BAG, INJECTION (ML) INTRAVENOUS
Status: DISPENSED
Start: 2020-10-06

## (undated) RX ORDER — LIDOCAINE HYDROCHLORIDE 10 MG/ML
INJECTION, SOLUTION EPIDURAL; INFILTRATION; INTRACAUDAL; PERINEURAL
Status: DISPENSED
Start: 2020-10-06

## (undated) RX ORDER — ACETAMINOPHEN 325 MG/1
TABLET ORAL
Status: DISPENSED
Start: 2020-10-06

## (undated) RX ORDER — ONDANSETRON 2 MG/ML
INJECTION INTRAMUSCULAR; INTRAVENOUS
Status: DISPENSED
Start: 2020-10-06

## (undated) RX ORDER — CLINDAMYCIN PHOSPHATE 900 MG/50ML
INJECTION, SOLUTION INTRAVENOUS
Status: DISPENSED
Start: 2020-10-06

## (undated) RX ORDER — DEXAMETHASONE SODIUM PHOSPHATE 4 MG/ML
INJECTION, SOLUTION INTRA-ARTICULAR; INTRALESIONAL; INTRAMUSCULAR; INTRAVENOUS; SOFT TISSUE
Status: DISPENSED
Start: 2020-10-06

## (undated) RX ORDER — PROPOFOL 10 MG/ML
INJECTION, EMULSION INTRAVENOUS
Status: DISPENSED
Start: 2020-10-06

## (undated) RX ORDER — TRANEXAMIC ACID 10 MG/ML
INJECTION, SOLUTION INTRAVENOUS
Status: DISPENSED
Start: 2020-10-06

## (undated) RX ORDER — SCOLOPAMINE TRANSDERMAL SYSTEM 1 MG/1
PATCH, EXTENDED RELEASE TRANSDERMAL
Status: DISPENSED
Start: 2020-10-06